# Patient Record
Sex: MALE | Race: WHITE
[De-identification: names, ages, dates, MRNs, and addresses within clinical notes are randomized per-mention and may not be internally consistent; named-entity substitution may affect disease eponyms.]

---

## 2017-09-22 ENCOUNTER — HOSPITAL ENCOUNTER (OUTPATIENT)
Dept: HOSPITAL 76 - LAB.F | Age: 62
Discharge: HOME | End: 2017-09-22
Attending: INTERNAL MEDICINE
Payer: COMMERCIAL

## 2017-09-22 ENCOUNTER — HOSPITAL ENCOUNTER (OUTPATIENT)
Dept: HOSPITAL 76 - DI | Age: 62
Discharge: HOME | End: 2017-09-22
Attending: INTERNAL MEDICINE
Payer: COMMERCIAL

## 2017-09-22 DIAGNOSIS — M16.11: Primary | ICD-10-CM

## 2017-09-22 DIAGNOSIS — M25.551: ICD-10-CM

## 2017-09-22 DIAGNOSIS — I10: Primary | ICD-10-CM

## 2017-09-22 DIAGNOSIS — E66.9: ICD-10-CM

## 2017-09-22 LAB
ALBUMIN/GLOB SERPL: 1.4 {RATIO} (ref 1–2.2)
ANION GAP SERPL CALCULATED.4IONS-SCNC: 8 MMOL/L (ref 6–13)
BASOPHILS NFR BLD AUTO: 0.1 10^3/UL (ref 0–0.1)
BASOPHILS NFR BLD AUTO: 0.8 %
BILIRUB BLD-MCNC: 0.8 MG/DL (ref 0.2–1)
BUN SERPL-MCNC: 21 MG/DL (ref 6–20)
CALCIUM UR-MCNC: 9.3 MG/DL (ref 8.5–10.3)
CHLORIDE SERPL-SCNC: 101 MMOL/L (ref 101–111)
CO2 SERPL-SCNC: 29 MMOL/L (ref 21–32)
CREAT SERPLBLD-SCNC: 1.2 MG/DL (ref 0.6–1.2)
EOSINOPHIL # BLD AUTO: 0.7 10^3/UL (ref 0–0.7)
EOSINOPHIL NFR BLD AUTO: 8.7 %
ERYTHROCYTE [DISTWIDTH] IN BLOOD BY AUTOMATED COUNT: 13.2 % (ref 12–15)
GFRSERPLBLD MDRD-ARVRAT: 62 ML/MIN/{1.73_M2} (ref 89–?)
GLOBULIN SER-MCNC: 3 G/DL (ref 2.1–4.2)
GLUCOSE SERPL-MCNC: 102 MG/DL (ref 70–100)
HCT VFR BLD AUTO: 40.8 % (ref 42–52)
HGB UR QL STRIP: 14.1 G/DL (ref 14–18)
LYMPHOCYTES # SPEC AUTO: 1.7 10^3/UL (ref 1.5–3.5)
LYMPHOCYTES NFR BLD AUTO: 22.4 %
MCH RBC QN AUTO: 31.4 PG (ref 27–31)
MCHC RBC AUTO-ENTMCNC: 34.6 G/DL (ref 32–36)
MCV RBC AUTO: 90.8 FL (ref 80–94)
MONOCYTES # BLD AUTO: 1 10^3/UL (ref 0–1)
MONOCYTES NFR BLD AUTO: 13.1 %
NEUTROPHILS # BLD AUTO: 4.2 10^3/UL (ref 1.5–6.6)
NEUTROPHILS # SNV AUTO: 7.6 X10^3/UL (ref 4.8–10.8)
NEUTROPHILS NFR BLD AUTO: 55 %
NRBC # BLD AUTO: 0 /100WBC
PDW BLD AUTO: 7.3 FL (ref 7.4–11.4)
POTASSIUM SERPL-SCNC: 3.8 MMOL/L (ref 3.5–5)
PROT SPEC-MCNC: 7.3 G/DL (ref 6.7–8.2)
RBC MAR: 4.49 10^6/UL (ref 4.7–6.1)
SODIUM SERPLBLD-SCNC: 138 MMOL/L (ref 135–145)
WBC # BLD: 7.6 X10^3/UL

## 2017-09-22 PROCEDURE — 85025 COMPLETE CBC W/AUTO DIFF WBC: CPT

## 2017-09-22 PROCEDURE — 36415 COLL VENOUS BLD VENIPUNCTURE: CPT

## 2017-09-22 PROCEDURE — 80053 COMPREHEN METABOLIC PANEL: CPT

## 2017-09-24 NOTE — XRAY REPORT
EXAM:

RIGHT HIP AND PELVIS RADIOGRAPHY

 

EXAM DATE: 9/22/2017 05:50 PM.

 

HISTORY: HIP PAIN RIGHT.

 

COMPARISONS: None.

 

TECHNIQUE: 1 view of the pelvis and 1 view of the hip.

 

FINDINGS: 

Bones: Normal. No fracture or bone lesion.

 

Joints: No malalignment. Severe joint space loss with near bone-on-bone laterally. Mild osteophytosis
.

 

Soft Tissues: Normal. No soft tissue swelling.

 

IMPRESSION: Advanced right hip arthritic changes with near bone-on-bone.

 

[facility]

Referring Provider Line: [phone]

 

SITE ID: [id number]

## 2019-08-24 ENCOUNTER — HOSPITAL ENCOUNTER (EMERGENCY)
Dept: HOSPITAL 76 - ED | Age: 64
Discharge: HOME | End: 2019-08-24
Payer: COMMERCIAL

## 2019-08-24 VITALS — DIASTOLIC BLOOD PRESSURE: 72 MMHG | SYSTOLIC BLOOD PRESSURE: 142 MMHG

## 2019-08-24 DIAGNOSIS — R05: Primary | ICD-10-CM

## 2019-08-24 DIAGNOSIS — I10: ICD-10-CM

## 2019-08-24 PROCEDURE — 99283 EMERGENCY DEPT VISIT LOW MDM: CPT

## 2019-08-24 PROCEDURE — 71046 X-RAY EXAM CHEST 2 VIEWS: CPT

## 2019-08-24 PROCEDURE — 99284 EMERGENCY DEPT VISIT MOD MDM: CPT

## 2019-08-24 NOTE — ED PHYSICIAN DOCUMENTATION
History of Present Illness





- Stated complaint


Stated Complaint: COUGH





- Chief complaint


Chief Complaint: Resp





- Additonal information


Additional information: 


This is a 64-year-old male who presents with a cough for 3 to 4 weeks.  Patient 

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 

sick with the bird flu in the last month.  He was tested for taco flu there and

his test came back negative, however he has had a persistent cough for 3 to 4 

weeks.  It is usually nonproductive, occasionally he will bring up a small 

amount of white to light yellow phlegm.  No hemoptysis. No leg swelling or 

history of blood clots. After repeated coughing he also has had some aching on 

his right side of his chest, otherwise no chest pain.  No shortness of breath, 

myalgias, or fever.  He does note that he recently had an abscessed tooth in his

mouth which was treated by dentist, and he has been on Augmentin for the last 3 

to 4 days. He takes lisinopril for HTN.








Review of Systems


Constitutional: denies: Fever


Throat: reports: Other (+ for recently diagnosed dental abscess, treated)


Cardiac: denies: Pedal edema, Calf pain


Respiratory: reports: Cough


GI: denies: Abdominal Pain, Vomiting





PD PAST MEDICAL HISTORY





- Past Medical History


Cardiovascular: Hypertension





- Present Medications


Home Medications: 


                                Ambulatory Orders











 Medication  Instructions  Recorded  Confirmed


 


Benzonatate [Tessalon Perle] 100 - 200 mg PO TID PRN #30 capsule 08/24/19 


 


Lisinopril 10 mg PO 08/24/19 














- Allergies


Allergies/Adverse Reactions: 


                                    Allergies











Allergy/AdvReac Type Severity Reaction Status Date / Time


 


No Known Drug Allergies Allergy   Verified 08/24/19 07:47














- Social History


Does the pt smoke?: No


Smoking Status: Never smoker





PD ED PE NORMAL





- Vitals


Vital signs reviewed: Yes





- General


General: Alert and oriented X 3, No acute distress





- HEENT


HEENT: PERRL





- Cardiac


Cardiac: RRR, No murmur





- Respiratory


Respiratory: No respiratory distress, Clear bilaterally, Other (Intermittent 

cough)





- Abdomen


Abdomen: Non distended





- Derm


Derm: Warm and dry





- Extremities


Extremities: No deformity





- Neuro


Neuro: Alert and oriented X 3





- Psych


Psych: Normal mood, Normal affect





Results





- Vitals


Vitals: 


                               Vital Signs - 24 hr











  08/24/19





  07:46


 


Temperature 36.2 C L


 


Heart Rate 82


 


Respiratory 18





Rate 


 


Blood Pressure 142/72 H


 


O2 Saturation 98








                                     Oxygen











O2 Source                      Room air

















- Rads (name of study)


  ** chest


Radiology: Other (No acute cardiopulmonary abnormality.)





PD MEDICAL DECISION MAKING





- ED course


Complexity details: considered differential (Viral syndrome, pneumonia, post 

viral cough, effusion, pneumothorax, lung mass, atypical pneumonia, ACE 

inhibitor cough)


ED course: 


On exam patient is well-appearing, triage vital signs are notable for 

hypertension. 2 view CXR was obtained showing no acute cardiopulmonary 

abnormality. Patient's history and exam are not consistent with heart failure, 

PE, or ACS.





I discussed with patient that I do not see signs of an acute bacterial infection

that is likely to benefit from antibiotics at this time.  No signs of 

pneumothorax, pneumonia, or effusion on chest x-ray. This may represent a viral 

syndrome or post viral cough. If his cough is not improving, he may also have a 

bradykinin/ACE inhibitor related cough, he can follow-up with his primary care 

provider to discuss changes to this medication if his cough persists. We will 

trial tessalon perles and he may also try tea with honey. Return precautions 

discussed and patient was discharged home.








Departure





- Departure


Disposition: 01 Home, Self Care


Clinical Impression: 


 Cough





Condition: Good


Instructions:  ED Cough Chronic Cause Unkn


Follow-Up: 


Mt Hopkins MD [Primary Care Provider] - Within 1 week


Prescriptions: 


Benzonatate [Tessalon Perle] 100 - 200 mg PO TID PRN #30 capsule


 PRN Reason: Cough


Comments: 


You were seen today for cough. I do not see signs of pneumonia or other clear 

abnormality on your chest XR. You may try tessalon perles for the cough, also 

try tea with honey. Please follow up with your PCP. If your cough persists, it 

may be due to the lisinopril and this may need to be changed. If you have fever,

shortness of breath, blood in your sputum, please return to the ED.

## 2019-08-24 NOTE — XRAY REPORT
Reason:  cough x 3-4weeks

Procedure Date:  08/24/2019   

Accession Number:  [id number] / [id number]                    

Procedure:  XR  - Chest 2 View X-Ray CPT Code:  59891

 

FULL RESULT:

 

 

EXAM:

CHEST RADIOGRAPHY

 

EXAM DATE: 8/24/2019 08:10 AM.

 

CLINICAL HISTORY: Cough x 3-4weeks.

 

COMPARISON: None.

 

TECHNIQUE: 2 views.

 

FINDINGS:

Lungs/Pleura: No focal airspace opacities. No pleural effusion or 

pneumothorax.

 

Mediastinum: Cardia mediastinal silhouette is within normal limits. 

Pulmonary vasculature is unremarkable.

 

Other: None.

IMPRESSION:

No acute cardiopulmonary abnormality.

 

[facility]

## 2019-09-03 ENCOUNTER — HOSPITAL ENCOUNTER (OUTPATIENT)
Dept: HOSPITAL 76 - LAB.S | Age: 64
Discharge: HOME | End: 2019-09-03
Attending: INTERNAL MEDICINE
Payer: COMMERCIAL

## 2019-09-03 DIAGNOSIS — I10: ICD-10-CM

## 2019-09-03 DIAGNOSIS — Z12.5: ICD-10-CM

## 2019-09-03 DIAGNOSIS — Z00.00: Primary | ICD-10-CM

## 2019-09-03 LAB
ALBUMIN DIAFP-MCNC: 4 G/DL (ref 3.2–5.5)
ALBUMIN/GLOB SERPL: 1.2 {RATIO} (ref 1–2.2)
ALP SERPL-CCNC: 34 IU/L (ref 42–121)
ALT SERPL W P-5'-P-CCNC: 17 IU/L (ref 10–60)
ANION GAP SERPL CALCULATED.4IONS-SCNC: 7 MMOL/L (ref 6–13)
AST SERPL W P-5'-P-CCNC: 17 IU/L (ref 10–42)
BILIRUB BLD-MCNC: 0.6 MG/DL (ref 0.2–1)
BUN SERPL-MCNC: 20 MG/DL (ref 6–20)
CALCIUM UR-MCNC: 9 MG/DL (ref 8.5–10.3)
CHLORIDE SERPL-SCNC: 104 MMOL/L (ref 101–111)
CHOLEST SERPL-MCNC: 192 MG/DL
CO2 SERPL-SCNC: 28 MMOL/L (ref 21–32)
CREAT SERPLBLD-SCNC: 1 MG/DL (ref 0.6–1.2)
GFRSERPLBLD MDRD-ARVRAT: 75 ML/MIN/{1.73_M2} (ref 89–?)
GLOBULIN SER-MCNC: 3.3 G/DL (ref 2.1–4.2)
GLUCOSE SERPL-MCNC: 117 MG/DL (ref 70–100)
HDLC SERPL-MCNC: 51 MG/DL
HDLC SERPL: 3.8 {RATIO} (ref ?–5)
LDLC SERPL CALC-MCNC: 131 MG/DL
LDLC/HDLC SERPL: 2.6 {RATIO} (ref ?–3.6)
PROT SPEC-MCNC: 7.3 G/DL (ref 6.7–8.2)
SODIUM SERPLBLD-SCNC: 139 MMOL/L (ref 135–145)
VLDLC SERPL-SCNC: 10 MG/DL

## 2019-09-03 PROCEDURE — 84153 ASSAY OF PSA TOTAL: CPT

## 2019-09-03 PROCEDURE — 83721 ASSAY OF BLOOD LIPOPROTEIN: CPT

## 2019-09-03 PROCEDURE — 80061 LIPID PANEL: CPT

## 2019-09-03 PROCEDURE — 80053 COMPREHEN METABOLIC PANEL: CPT

## 2019-09-03 PROCEDURE — 36415 COLL VENOUS BLD VENIPUNCTURE: CPT

## 2020-10-07 ENCOUNTER — HOSPITAL ENCOUNTER (OUTPATIENT)
Dept: HOSPITAL 76 - COV | Age: 65
Discharge: HOME | End: 2020-10-07
Attending: FAMILY MEDICINE
Payer: COMMERCIAL

## 2020-10-07 DIAGNOSIS — Z20.828: Primary | ICD-10-CM

## 2020-11-19 ENCOUNTER — HOSPITAL ENCOUNTER (OUTPATIENT)
Dept: HOSPITAL 76 - COV | Age: 65
Discharge: HOME | End: 2020-11-19
Attending: FAMILY MEDICINE
Payer: COMMERCIAL

## 2020-11-19 DIAGNOSIS — Z20.828: Primary | ICD-10-CM

## 2020-12-12 ENCOUNTER — HOSPITAL ENCOUNTER (OUTPATIENT)
Age: 65
End: 2020-12-12
Payer: COMMERCIAL

## 2020-12-12 DIAGNOSIS — Z11.59: Primary | ICD-10-CM

## 2020-12-12 PROCEDURE — 87635 SARS-COV-2 COVID-19 AMP PRB: CPT

## 2020-12-14 ENCOUNTER — HOSPITAL ENCOUNTER (OUTPATIENT)
Dept: HOSPITAL 73 - OR | Age: 65
LOS: 1 days | Discharge: HOME | End: 2020-12-15
Payer: COMMERCIAL

## 2020-12-14 VITALS
DIASTOLIC BLOOD PRESSURE: 64 MMHG | SYSTOLIC BLOOD PRESSURE: 107 MMHG | RESPIRATION RATE: 13 BRPM | OXYGEN SATURATION: 96 % | HEART RATE: 95 BPM

## 2020-12-14 VITALS
RESPIRATION RATE: 16 BRPM | OXYGEN SATURATION: 96 % | SYSTOLIC BLOOD PRESSURE: 104 MMHG | TEMPERATURE: 97.52 F | DIASTOLIC BLOOD PRESSURE: 70 MMHG | HEART RATE: 88 BPM

## 2020-12-14 VITALS
TEMPERATURE: 97.16 F | DIASTOLIC BLOOD PRESSURE: 71 MMHG | OXYGEN SATURATION: 96 % | RESPIRATION RATE: 17 BRPM | SYSTOLIC BLOOD PRESSURE: 107 MMHG | HEART RATE: 92 BPM

## 2020-12-14 VITALS
DIASTOLIC BLOOD PRESSURE: 55 MMHG | HEART RATE: 96 BPM | OXYGEN SATURATION: 94 % | TEMPERATURE: 98.42 F | SYSTOLIC BLOOD PRESSURE: 101 MMHG | RESPIRATION RATE: 14 BRPM

## 2020-12-14 VITALS
DIASTOLIC BLOOD PRESSURE: 64 MMHG | HEART RATE: 71 BPM | RESPIRATION RATE: 17 BRPM | OXYGEN SATURATION: 92 % | SYSTOLIC BLOOD PRESSURE: 112 MMHG

## 2020-12-14 VITALS
SYSTOLIC BLOOD PRESSURE: 96 MMHG | TEMPERATURE: 97.88 F | DIASTOLIC BLOOD PRESSURE: 68 MMHG | RESPIRATION RATE: 18 BRPM | HEART RATE: 82 BPM | OXYGEN SATURATION: 93 %

## 2020-12-14 VITALS
DIASTOLIC BLOOD PRESSURE: 50 MMHG | RESPIRATION RATE: 16 BRPM | OXYGEN SATURATION: 92 % | HEART RATE: 87 BPM | TEMPERATURE: 97.88 F | SYSTOLIC BLOOD PRESSURE: 116 MMHG

## 2020-12-14 VITALS
SYSTOLIC BLOOD PRESSURE: 92 MMHG | OXYGEN SATURATION: 94 % | RESPIRATION RATE: 13 BRPM | DIASTOLIC BLOOD PRESSURE: 59 MMHG | HEART RATE: 95 BPM

## 2020-12-14 VITALS
OXYGEN SATURATION: 98 % | RESPIRATION RATE: 16 BRPM | TEMPERATURE: 97.52 F | SYSTOLIC BLOOD PRESSURE: 126 MMHG | DIASTOLIC BLOOD PRESSURE: 80 MMHG | HEART RATE: 72 BPM

## 2020-12-14 VITALS
DIASTOLIC BLOOD PRESSURE: 73 MMHG | HEART RATE: 81 BPM | RESPIRATION RATE: 17 BRPM | OXYGEN SATURATION: 92 % | TEMPERATURE: 97.52 F | SYSTOLIC BLOOD PRESSURE: 103 MMHG

## 2020-12-14 VITALS — BODY MASS INDEX: 31.8 KG/M2 | BODY MASS INDEX: 33.2 KG/M2

## 2020-12-14 VITALS
RESPIRATION RATE: 11 BRPM | SYSTOLIC BLOOD PRESSURE: 91 MMHG | OXYGEN SATURATION: 97 % | HEART RATE: 101 BPM | DIASTOLIC BLOOD PRESSURE: 54 MMHG

## 2020-12-14 VITALS
SYSTOLIC BLOOD PRESSURE: 114 MMHG | RESPIRATION RATE: 17 BRPM | HEART RATE: 85 BPM | TEMPERATURE: 97.2 F | DIASTOLIC BLOOD PRESSURE: 72 MMHG | OXYGEN SATURATION: 95 %

## 2020-12-14 VITALS — OXYGEN SATURATION: 96 %

## 2020-12-14 DIAGNOSIS — M25.751: ICD-10-CM

## 2020-12-14 DIAGNOSIS — M16.11: Primary | ICD-10-CM

## 2020-12-14 DIAGNOSIS — R73.03: ICD-10-CM

## 2020-12-14 DIAGNOSIS — I10: ICD-10-CM

## 2020-12-14 LAB
ADD MANUAL DIFF / SLIDE REVIEW: NO
HEMATOCRIT: 34.1 % (ref 41–53)
HEMOGLOBIN: 11.4 G/DL (ref 13.5–17.5)
LYMPHOCYTES # SPEC AUTO: 400 /UL (ref 1100–4500)
MCV RBC: 90.2 FL (ref 80–100)
MEAN CORPUSCULAR HEMOGLOBIN: 30.3 PG (ref 26–34)
MEAN CORPUSCULAR HGB CONC: 33.6 % (ref 30–36)
PLATELET COUNT: 216 X10^3/UL (ref 150–400)

## 2020-12-14 PROCEDURE — 97161 PT EVAL LOW COMPLEX 20 MIN: CPT

## 2020-12-14 PROCEDURE — 85025 COMPLETE CBC W/AUTO DIFF WBC: CPT

## 2020-12-14 PROCEDURE — 97116 GAIT TRAINING THERAPY: CPT

## 2020-12-14 PROCEDURE — 76000 FLUOROSCOPY <1 HR PHYS/QHP: CPT

## 2020-12-14 PROCEDURE — 85018 HEMOGLOBIN: CPT

## 2020-12-14 PROCEDURE — 72170 X-RAY EXAM OF PELVIS: CPT

## 2020-12-14 PROCEDURE — 73502 X-RAY EXAM HIP UNI 2-3 VIEWS: CPT

## 2020-12-14 PROCEDURE — 85014 HEMATOCRIT: CPT

## 2020-12-14 PROCEDURE — 27130 TOTAL HIP ARTHROPLASTY: CPT

## 2020-12-14 PROCEDURE — 94762 N-INVAS EAR/PLS OXIMTRY CONT: CPT

## 2020-12-14 PROCEDURE — 36415 COLL VENOUS BLD VENIPUNCTURE: CPT

## 2020-12-14 RX ADMIN — CELECOXIB 200 MG: 200 CAPSULE ORAL at 08:57

## 2020-12-14 RX ADMIN — SODIUM CHLORIDE, SODIUM LACTATE, POTASSIUM CHLORIDE, AND CALCIUM CHLORIDE 42 ML: .6; .31; .03; .02 INJECTION, SOLUTION INTRAVENOUS at 13:21

## 2020-12-14 RX ADMIN — SODIUM CHLORIDE, SODIUM LACTATE, POTASSIUM CHLORIDE, AND CALCIUM CHLORIDE 125 ML: .6; .31; .03; .02 INJECTION, SOLUTION INTRAVENOUS at 14:38

## 2020-12-14 RX ADMIN — Medication 975 MG: at 08:57

## 2020-12-14 RX ADMIN — TRANEXAMIC ACID 2000 MG: 100 INJECTION, SOLUTION INTRAVENOUS at 13:02

## 2020-12-14 RX ADMIN — SODIUM CHLORIDE, SODIUM LACTATE, POTASSIUM CHLORIDE, AND CALCIUM CHLORIDE 42 ML: .6; .31; .03; .02 INJECTION, SOLUTION INTRAVENOUS at 09:05

## 2020-12-14 RX ADMIN — ASPIRIN 81 MG: 81 TABLET, COATED ORAL at 20:18

## 2020-12-14 RX ADMIN — PREGABALIN 75 MG: 50 CAPSULE ORAL at 08:56

## 2020-12-14 RX ADMIN — TRANEXAMIC ACID 2000 MG: 100 INJECTION, SOLUTION INTRAVENOUS at 11:41

## 2020-12-14 RX ADMIN — MORPHINE SULFATE 4 MG: 4 INJECTION, SOLUTION INTRAMUSCULAR; INTRAVENOUS at 11:40

## 2020-12-14 RX ADMIN — ROPIVACAINE HYDROCHLORIDE 60 ML: 5 INJECTION, SOLUTION EPIDURAL; INFILTRATION; PERINEURAL at 11:39

## 2020-12-14 RX ADMIN — CEFAZOLIN SODIUM 200 GM: 2 INJECTION, SOLUTION INTRAVENOUS at 10:57

## 2020-12-14 RX ADMIN — CEFAZOLIN SODIUM 200 GM: 2 INJECTION, SOLUTION INTRAVENOUS at 20:18

## 2020-12-14 RX ADMIN — SODIUM CHLORIDE 0 ML: 0.9 IRRIGANT IRRIGATION at 11:41

## 2020-12-14 RX ADMIN — KETOROLAC TROMETHAMINE 30 MG: 30 INJECTION, SOLUTION INTRAMUSCULAR at 11:40
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
## 2020-12-14 NOTE — DI.RAD.S_ITS
PROCEDURE:  XR HIP W PEL IF DONE RT 2V  
   
INDICATIONS:  INTEROPERATIVE RIGHT HIP  
   
TECHNIQUE:  2 views of the hip were acquired.    
   
COMPARISON:  None.  
   
FINDINGS:    
   
Right hip arthroplasty in expected intraoperative alignment   
   
Dictated by: Schuyler Pryor M.D. on 12/14/2020 at 15:46       
Approved by: Schuyler Pryor M.D. on 12/14/2020 at 15:47

## 2020-12-14 NOTE — SUR.PHASEI
Stable PACU stay, pt transported on room air, left in room with Cordell RN, bed down, locked and SCD's on Call light in reach.

## 2020-12-14 NOTE — SUR.OPER
Supine on padded [place] table with bilateral legs secured in padded positioning boots and suspended in positioning spars, operative leg in traction per surgeon. Head on one pillow. Arm on non-operative side secured on padded armboard <90 degrees 
abduction. Arm on operative side padded and resting across chest then secured with tape over sheet. Padded perineal post in place per surgeon.

## 2020-12-14 NOTE — DI.RAD.S_ITS
PROCEDURE:  XR PELVIS 1-2V  
   
INDICATIONS:  POST OPERATIVE RIGHT HIP  
   
TECHNIQUE:  1 view of the lower pelvis acquired.    
   
COMPARISON:  None.  
   
FINDINGS:    
   
Bones:  Patient is status post right hip arthroplasty, with hardware components in   
expected positions.  The hip joint appears congruent.  The visualized bony structures   
appear intact.    
   
Soft tissues:  Overlying postoperative changes are noted.  No suspicious soft tissue   
densities.    
   
   
IMPRESSION:  Post right total hip arthroplasty changes with anatomic right hip alignment.  
   
   
Dictated by: Jose A Warren M.D. on 12/14/2020 at 14:25       
Approved by: Jose A Warren M.D. on 12/14/2020 at 14:26

## 2020-12-14 NOTE — P.OP.PRE_ITS
Pre-operative Note    
COVID-19    
COVID-19 status: Negative    
Result date/Date tested (Pos, Neg/Pending): 12/12/20    
Interval Note    
History & Physical reviewed/Exam performed by Physician: Yes    
Changes to H&P: No    
H&P completed within 30 days and has changed as indicated here:: Plan for right   
anterior PARIS

## 2020-12-14 NOTE — P.OP_ITS
"Operative Date/Time/Diagnoses    
Date of procedure: 12/14/20    
Time of procedure: 13:20    
Pre-op diagnosis: right hip advanced OA with femoral head collapse      
    
Post-op diagnosis: same    
    
Procedure & Clinicians    
Procedure: Right anterior total hip arthroplasty    
Same procedure as scheduled: Yes    
Indications: Right hip advanced osteoarthritis with femoral head collapse    
Surgeon: Eliseo Bernal    
Assistant: Naty Amaya    
Anesthesia Type: General and Spinal    
Operative Notes    
Findings: Large femoral neck osteophytes collapsed femoral head large medial   
wall osteophyte    
Closure Type: primary    
Specimen(s): none sent    
Prosthetic devices, grafts, tissues, transplants, or devices: [employer]   
R3 54 mm cup    
1x 35mm screw    
1x 25mm screw    
54 x 36 mm neutral offset liner    
Size 6 anthology femoral stem standard offset    
Biolox 36+ 4 femoral head    
Estimated Blood Loss (mL): 200    
Blood products transfused: none    
Procedure in detail: Patient was met in the preoperative holding area where the   
site and side of surgery were marked by MD.  Informed consent had been reviewed   
in clinic but was also again reviewed the preoperative holding area.  All last   
minute questions were answered.    
    
Patient was then brought operative in the operating room where he received a   
spinal anesthetic he was then transferred onto the [place] table and induced under   
general anesthesia.  Both feet were placed in well-padded [place] table boots the   
right hip was then prepped and draped normal sterile fashion.  A surgical time-  
out was performed verifying the site and side of surgery as well as the name of   
the patient.    
    
A 6 cm long incision starting approximately 2 cm distal and 2 cm lateral to the   
ASIS was made in the skin aiming towards the fibular head.  Skin incision was   
made with a 10. Blade followed by electrocautery dissection down to the level of  
the tensor fascia.  Tensor fascia was incised with a new 10. Blade.  An Allis   
clamp was placed on the medial leaflet of the tensor fascia and the tensor   
fascia muscle itself was reflected laterally.  A Meyerding was then placed over   
the lateral aspect of the rectus femoris and retracted medially a Cobra was then  
placed over the superior aspect of the femoral neck and this gave us good access  
to the ascending branches of the femoral circumflex vessels.  These were   
coagulated using electrocautery.  A 2nd Cobra retractor was then placed under   
the inferior aspect of the femoral neck.  A bent Hohmann was then placed over   
the superior lip of the acetabulum giving us good exposure to the capsule and   
inverted T-shaped capsulotomy was then performed the superior and inferior   
leaflets were tagged with FiberWire suture the Cobra retractors then placed   
intracapsularly.  This gave us good exposure to the femoral neck.  A   
reciprocating saw was then used to make a femoral neck cut based off of our   
preoperative templated neck length.  The leg was then externally rotate 45? and   
a corkscrew was then used to remove the femoral head.  Once this was completed a  
soft tissue sleeve protector was then placed into to the wound and retractors   
were placed for acetabular exposure.    
    
Electrocautery and suction were then used to remove the pulvinar.  A rongeur and  
a [place] blade were then used to remove the remnant labrum.  We began reaming   
with a 44 mm Reamer to medialized.  Once this was completed it on removed more   
hole in our which was then removed view with electrocautery.  Fluoroscopy was   
brought in for guidance during acetabular reaming.  This was matched our   
preoperative template standing films. We then began upsizing until we were able   
to eliminate much of the superior rim defect from the femoral head collapse   
which had started to sublux out of the hip.  At 52 mm we began to get good   

[id number]|2020-12-15 08:29:23|2020-12-15 08:29:23|PM.PN.1||||"Subjective

## 2020-12-15 VITALS
TEMPERATURE: 97.52 F | OXYGEN SATURATION: 94 % | RESPIRATION RATE: 18 BRPM | SYSTOLIC BLOOD PRESSURE: 94 MMHG | HEART RATE: 70 BPM | DIASTOLIC BLOOD PRESSURE: 64 MMHG

## 2020-12-15 VITALS
HEART RATE: 74 BPM | TEMPERATURE: 98.6 F | OXYGEN SATURATION: 100 % | RESPIRATION RATE: 16 BRPM | DIASTOLIC BLOOD PRESSURE: 61 MMHG | SYSTOLIC BLOOD PRESSURE: 109 MMHG

## 2020-12-15 VITALS — RESPIRATION RATE: 16 BRPM | HEART RATE: 72 BPM | OXYGEN SATURATION: 94 %

## 2020-12-15 LAB
HEMATOCRIT: 30.1 % (ref 41–53)
HEMOGLOBIN: 10.1 G/DL (ref 13.5–17.5)

## 2020-12-15 RX ADMIN — ASPIRIN 81 MG: 81 TABLET, COATED ORAL at 08:20

## 2020-12-15 RX ADMIN — CEFAZOLIN SODIUM 200 GM: 2 INJECTION, SOLUTION INTRAVENOUS at 03:29

## 2020-12-15 RX ADMIN — SODIUM CHLORIDE, SODIUM LACTATE, POTASSIUM CHLORIDE, AND CALCIUM CHLORIDE 125 ML: .6; .31; .03; .02 INJECTION, SOLUTION INTRAVENOUS at 00:22

## 2020-12-15 RX ADMIN — Medication 650 MG: at 08:20
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
## 2020-12-15 NOTE — CM.DANOTE
DCP: Case received, EMR reviewed and met with patient. Wife, Marisol, also present in room. DCP assessment completed with information currently available. DCP assessment completed with information currently available. DCP assessment completed with 
information currently available.

Patient is a 65 year old male who admitted yesterday morning to the care of the orthopedic team.
PCP: Dr. Rao.
Payer: confirmed: [employer].

Patient came to the hospital for a surgical procedure. He had right total hip arthroplasty. Patient has had history of chronic right hip pain, secondary to osteoarthritis of the right hip.

Met with patient and wife, Marisol. They both reside in [place]. Patient is retired, he used to be a diplomat, and had done traveling in the past. He is alert and oriented,  pleasant . He is independent at baseline, uses a walking stick for 
walking outside long distances.

P: Patient is to be discharged home after working with P.T. Wife will transport.



Nisha Ordoñez RN/

## 2020-12-15 NOTE — PC.NURSE
DISCHARGE:

PATIENT DENIES PAIN, CMS INTACT, DRSG CDI. VOIDING, PASSING FLATUS. CLEARED FOR DC HOME BY PHYSICAL THERAPY. SPOUSE AT BEDSIDE. IV DC'D INTACT, REVIEWED ALL DC HOME PAPERWORK/INSTRUCTIONS W/ THEM, PROVIDED SCRIPTS. PATIENT LEFT W/ ALL BELONGINGS 
WITHOUT S/SX'S OF DISTRESS. VERY PLEASANT COUPLE. TAKEN BY WC TO VEHICLE BY CNA ESCORT.

## 2020-12-15 NOTE — PT.IPTN
"
Current Diagnoses

Unilateral primary osteoarthritis, right hip (12/14/20)

Surgery Performed

Operation Date: 12/14/20 10:45
Actual Procedures
p Total Hip Arthroplasty/Anterior Approach(Right) - Eliseo Bernal MD

Physical Therapy Treatment Note

M2 PT-IP Current Condition                                 Start:  12/14/20 16:10
Freq:   AS NEEDED                                          Status: Discharge     
Protocol:                                                                        
 Document     12/14/20 16:42  AW  (Rec: 12/14/20 16:54  AW  [id number])
 Physical Therapy Current Condition
     Current Condition
      Evaluation Date                            12/14/20
      Treatment Diagnosis                        R [place] with anterior approach;
                                                 difficulty in walking
      Onset Date                                 12/14/20
     Precautions
      Anterior Hip Precautions                   No Hip Extension,No Hip
                                                 External Rotation
     Weight Bearing Status
      Weight Bearing Status                      Weight Bear as Tolerated
M3 PT-IP Subjective                                        Start:  12/14/20 16:10
Freq:   AS NEEDED                                          Status: Discharge     
Protocol:                                                                        
 Document     12/15/20 09:11  CLB  (Rec: 12/15/20 12:25  CLB  [id number])
 Subjective
     Physical Therapy Visit Type
      Type                                       Initial Evaluation
      Visit Start Time                           09:11
      Visit Stop Time                            09:30
      Total Visit Minutes                        19
      Number of PTA Visits                       1
     Physical Therapy Visit Comments
      Patient Comments                           Pt willing to work with
                                                 therapy.
      Patient Goals                              Return home ASAP.
 Therapy Pain Assessment
     Pain
      When Pain Assessed                         During Mobility
     Pain Present
      Pain Present                               Denied Pain
     Location
      Right Hip
       Intensity                                 2
       Scale Used                                Numeric (0 - 10)
       Description                               Aching
       Pain Management Techniques                Timing of Activity with
                                                 Medications
M4 PT-IP Mobility and Gait                                 Start:  12/14/20 16:10
Freq:   AS NEEDED                                          Status: Discharge     
Protocol:                                                                        
 Document     12/15/20 09:11  CLB  (Rec: 12/15/20 12:25  CLB  [id number])
 PT-Bed Mobility Assessment
     Supine to Sit
       Supine to Sit                             Standby Assistance
 PT-Transfer Assessment
     Sit to and From Stand
       Sit to and from Stand                     Standby Assistance
     Equipment
       Transfer Assistive Device                 Gait Belt,Front Wheeled Walker
       Orthotic/Prosthetic Devices or Brace:     No
     Transfers
       Transfer Destination                      Bed
       Transfer Technique                        Amb with FWW
     Transfer Ability
       Level of Assist                           Standby Assistance
     Comments
       Mobility Comments                         Pt ambulating in room with CNA
                                                 and wife present. Pt
                                                 ambulated from BR to sink and
                                                 stood SBA to brush teeth. Pt
                                                 able to recall anterior hip
                                                 precautions. P
[id number]|2020-12-14 14:20:01|2020-12-14 14:20:01|PC.NURSE||||"Day shift:

## 2021-01-26 ENCOUNTER — HOSPITAL ENCOUNTER (OUTPATIENT)
Dept: HOSPITAL 76 - COV | Age: 66
Discharge: HOME | End: 2021-01-26
Attending: FAMILY MEDICINE
Payer: COMMERCIAL

## 2021-01-26 DIAGNOSIS — Z20.822: Primary | ICD-10-CM

## 2021-11-24 ENCOUNTER — HOSPITAL ENCOUNTER (OUTPATIENT)
Age: 66
End: 2021-11-24
Payer: MEDICARE

## 2021-11-24 VITALS — BODY MASS INDEX: 31.8 KG/M2

## 2021-11-24 DIAGNOSIS — Z20.822: Primary | ICD-10-CM

## 2021-11-24 PROCEDURE — 87635 SARS-COV-2 COVID-19 AMP PRB: CPT
